# Patient Record
(demographics unavailable — no encounter records)

---

## 2024-10-07 NOTE — HISTORY OF PRESENT ILLNESS
[FreeTextEntry1] : Gage Sullivan is a 74 y/o man with Hx of CKD, HTN, HLD, DMII, CAD s/p PCI 1/5/24 with CHANDRIKA to LAD (1/2024) and persistent afib who presents today for initial evaluated. Admits first being told he has afib in February 2024, on Eliquis since that time. On review of hospital records, may have had afib for much longer but never treated. Feels well, does have some dyspnea when attempting to exercise. Denies chest pain, palpitations, SOB at rest, syncope or near syncope.

## 2024-10-07 NOTE — DISCUSSION/SUMMARY
[EKG obtained to assist in diagnosis and management of assessed problem(s)] : EKG obtained to assist in diagnosis and management of assessed problem(s) [FreeTextEntry1] : Impression:  1. Persistent afib: EKG performed today to assess for presence of afib and reveals afib. Discussed treatment options for afib including rate control vs antiarrhythmics vs possible ablation. Given persistent afib despite rate control management discussed treatment options such as ablation vs DCCV. Prefers ablation. Risks, benefits, and alternatives to procedure discussed at length. Risks including that of bleeding, infection, stroke, and cardiac tamponade discussed and he verbalizes understanding of all. Will hold all medications the morning of the ablation. Hold Farxiga x3 days prior to procedure.   2. HTN: resume oral antihypertensives as prescribed. Encouraged heart healthy diet, sodium restriction, and weight loss. Continue regular f/u with Cardiologist for further HTN management.  3. HLD: resume statin therapy as prescribed and regular f/u with Cardiologist for routine lipid monitoring and management.  Plan for afib ablation and RTO for f/u post procedure.

## 2024-10-07 NOTE — CARDIOLOGY SUMMARY
[de-identified] : 10/7/24  [de-identified] : - s/p PCI CHANDRIKA pRCA and CHANDRIKA to mLAD on 1/4 and 1/5 [de-identified] : TTE 2/2/24 revealing EF 65%, no regional wall abnormalities

## 2024-10-07 NOTE — HISTORY OF PRESENT ILLNESS
[FreeTextEntry1] : Gage Sullivan is a 76 y/o man with Hx of CKD, HTN, HLD, DMII, CAD s/p PCI 1/5/24 with CHANDRIKA to LAD (1/2024) and persistent afib who presents today for initial evaluated. Admits first being told he has afib in February 2024, on Eliquis since that time. On review of hospital records, may have had afib for much longer but never treated. Feels well, does have some dyspnea when attempting to exercise. Denies chest pain, palpitations, SOB at rest, syncope or near syncope.

## 2024-10-07 NOTE — CARDIOLOGY SUMMARY
[de-identified] : 10/7/24  [de-identified] : - s/p PCI CHANDRIKA pRCA and CHANDRIKA to mLAD on 1/4 and 1/5 [de-identified] : TTE 2/2/24 revealing EF 65%, no regional wall abnormalities

## 2024-10-07 NOTE — REASON FOR VISIT
[Arrhythmia/ECG Abnorrmalities] : arrhythmia/ECG abnormalities [FreeTextEntry3] : Zaynab De Oliveira MD

## 2024-12-19 NOTE — HISTORY OF PRESENT ILLNESS
[FreeTextEntry1] : Gage Sullivan is a 74 y/o man with Hx of CKD, HTN, HLD, DMII, CAD s/p PCI 1/5/24 with CHANDRIKA to LAD (1/2024) and persistent afib now s/p PVI, PWI, and CTI ablation on 11/13/2024 who presents today for routine f/u post ablation. Admits doing well post ablation. Denies chest pain, palpitations, SOB, syncope or near syncope. Right groin without pain, swelling or bruising. Remains on Eliquis without s/s of bleeding.

## 2024-12-19 NOTE — CARDIOLOGY SUMMARY
[de-identified] : 10/7/24  [de-identified] : TTE 2/2/24 revealing EF 65%, no regional wall abnormalities [de-identified] : - s/p PCI CHANDRIKA pRCA and CHANDRIKA to mLAD on 1/4 and 1/5

## 2024-12-19 NOTE — DISCUSSION/SUMMARY
[EKG obtained to assist in diagnosis and management of assessed problem(s)] : EKG obtained to assist in diagnosis and management of assessed problem(s) [FreeTextEntry1] : Impression:  1. Persistent afib:  s/p PVI, PWI, and CTI ablation on 11/13/2024 . EKG performed today to assess for presence of afib and reveals afib. Needed multiple cardioversions during ablation to restore NSR. On review of records, has been in afib for many years untreated. Will attempt DCCV and possible antiarrhythmic initiation to restore NSR given recent ablation and currently in blanking period. Risks, benefits,and alternatives discussed.   2. HTN: resume oral antihypertensives as prescribed. Encouraged heart healthy diet, sodium restriction, and weight loss. Continue regular f/u with Cardiologist for further HTN management.  3. HLD: resume statin therapy as prescribed and regular f/u with Cardiologist for routine lipid monitoring and management.  Plan for DCCV.

## 2025-02-24 NOTE — HISTORY OF PRESENT ILLNESS
[FreeTextEntry1] : Gage Sullivan is a 75 y/o man with Hx of CKD, HTN, HLD, DMII, CAD s/p PCI 1/5/24 with CHANDRIKA to LAD (1/2024) and persistent afib now s/p PVI, PWI, and CTI ablation on 11/13/2024 and DCCV 1/2/2025 who presents today for routine f/u post DCCV. Admits doing well post cardioversion. Recent stress done 1/8/25 with no evidence ischemia LVEF 57%. Denies chest pain, palpitations, SOB, syncope or near syncope. Remains on Eliquis without s/s of bleeding.

## 2025-02-24 NOTE — CARDIOLOGY SUMMARY
[de-identified] : 10/7/24  [de-identified] : TTE 2/2/24 revealing EF 65%, no regional wall abnormalities [de-identified] : - s/p PCI CHANDRIKA pRCA and CHANDRIKA to mLAD on 1/4 and 1/5

## 2025-02-24 NOTE — DISCUSSION/SUMMARY
[EKG obtained to assist in diagnosis and management of assessed problem(s)] : EKG obtained to assist in diagnosis and management of assessed problem(s) [FreeTextEntry1] : Impression:  1. Persistent afib:  s/p PVI, PWI, and CTI ablation on 11/13/2024 and DCCV 1/2/2025 . EKG performed today to assess for presence of afib and reveals sinus bradycardia. HR 43. Patient asymptomatic but past HRs have been in 80s. Amio cut down to 100mg daily with intention to discontinue at next f/u in 3 months as patient will be through the blanking period. Rediscussed adverse effect profile of Amiodarone including need for frequent monitoring of TFTs, LFTs, Ophthalmology examination and PFTs. Of note: Needed multiple cardioversions during ablation to restore NSR. On review of records, has been in afib for many years untreated.   2. HTN: resume oral antihypertensives as prescribed. Encouraged heart healthy diet, sodium restriction, and weight loss. Continue regular f/u with Cardiologist for further HTN management.  3. HLD: resume statin therapy as prescribed and regular f/u with Cardiologist for routine lipid monitoring and management.  Resume routine f/u with Cardiologist and RTO for f/u in 3 months.

## 2025-06-02 NOTE — HISTORY OF PRESENT ILLNESS
[FreeTextEntry1] : Gage Sullivan is a 77 y/o man with Hx of CKD, HTN, HLD, DMII, CAD s/p PCI 1/5/24 with CHANDRIKA to LAD (1/2024) and persistent afib now s/p PVI, PWI, and CTI ablation on 11/13/2024 and DCCV 1/2/2025 who presents today for routine f/u post DCCV. Admits doing well post cardioversion. Recent stress done 1/8/25 with no evidence ischemia LVEF 57%. Denies chest pain, palpitations, SOB, syncope or near syncope. Remains on Eliquis without s/s of bleeding.

## 2025-06-02 NOTE — CARDIOLOGY SUMMARY
[de-identified] : 10/7/24  [de-identified] : TTE 2/2/24 revealing EF 65%, no regional wall abnormalities [de-identified] : - s/p PCI CHANDRIKA pRCA and CHANDRIKA to mLAD on 1/4 and 1/5

## 2025-06-02 NOTE — DISCUSSION/SUMMARY
[EKG obtained to assist in diagnosis and management of assessed problem(s)] : EKG obtained to assist in diagnosis and management of assessed problem(s) [FreeTextEntry1] : Impression:  1. Persistent afib:  s/p PVI, PWI, and CTI ablation on 11/13/2024 and DCCV 1/2/2025 . EKG performed today to assess for presence of afib and reveals sinus bradycardia. HR 48. Patient asymptomatic but past HRs have been in 80s. Amio cut down to 100mg daily with intention to discontinue at next f/u in 3 months as patient will be through the blanking period. Rediscussed adverse effect profile of Amiodarone including need for frequent monitoring of TFTs, LFTs, Ophthalmology examination and PFTs. Of note: Needed multiple cardioversions during ablation to restore NSR. On review of records, has been in afib for many years untreated.   2. HTN: resume oral antihypertensives as prescribed. Encouraged heart healthy diet, sodium restriction, and weight loss. Continue regular f/u with Cardiologist for further HTN management.  3. HLD: resume statin therapy as prescribed and regular f/u with Cardiologist for routine lipid monitoring and management.  Resume routine f/u with Cardiologist and RTO for f/u in 6 months.